# Patient Record
Sex: FEMALE | Race: WHITE | NOT HISPANIC OR LATINO | Employment: FULL TIME | URBAN - METROPOLITAN AREA
[De-identification: names, ages, dates, MRNs, and addresses within clinical notes are randomized per-mention and may not be internally consistent; named-entity substitution may affect disease eponyms.]

---

## 2020-09-04 ENCOUNTER — TELEPHONE (OUTPATIENT)
Dept: UROLOGY | Facility: MEDICAL CENTER | Age: 55
End: 2020-09-04

## 2020-09-04 NOTE — TELEPHONE ENCOUNTER
Please Triage - ER FU Kidney Stones ( Needs Surgery)  New Patient- [facility]    What is the reason for the patients appointment? Kidney Stones    CT 8/18  IMPRESSION:  Impression:   1  Stable hepatosplenomegaly with mild fatty infiltration of the liver  2  No change in mildly enlarged mediastinal and retroperitoneal lymph nodes  3  Multiple left renal calculi  Mild left hydroureteronephrosis with distal  ureteral calculus  4  Other findings as above  Do we accept the patient's insurance or is the patient Self-Pay? Provider:  Ab Griffin  Member ID#: [id number]     Has the patient had any previous urologist(s)? Yes     Have patient records been requested? Yes, being faxed     Has the patient had any outside testing done? Yes [facility]     What is the patients location preference for an office visit? [place]    Does the patient have a personal history of cancer?   Lymphoma, middle of chemo treatments    Patient can be reached at : [phone]

## 2020-09-08 NOTE — TELEPHONE ENCOUNTER
Patient scheduled for 9/14/20 at 2pm in the [facility] office with [address] B[place]  Please confirm

## 2020-09-11 NOTE — TELEPHONE ENCOUNTER
Patient called and canceled appointment due to being in the hospital  Patient advised that she will call back once she is discharged to get rescheduled

## 2020-09-21 NOTE — TELEPHONE ENCOUNTER
Patient canceled her previous appointment  Her CT reveals nonobstructing stones  Can be scheduled for next available new patient appointment   Patient should obtain her imaging on a disc prior to her visit

## 2020-09-21 NOTE — TELEPHONE ENCOUNTER
New Patient  Patient had CT abdomen pelvis w IV contrast done on 9/8/20 (results in care everywhere)  IMPRESSION:  Impression:   1  Abnormal mucosal wall thickening with fat stranding involving portions of the  colon, particularly the hepatic flexure, transverse colon, splenic flexure and  proximal to mid descending colon  Findings are concerning for colitis  No bowel  obstruction  No desire abscess or free air  2  Multiple left renal calculi are redemonstrated  No evidence for  hydroureteronephrosis  3  Scattered visible retroperitoneal lymph nodes are unchanged  4  Hepatosplenomegaly is redemonstrated  Hepatic steatosis

## 2020-09-21 NOTE — TELEPHONE ENCOUNTER
When patient calls the office back she can be scheduled for next available new patient appointment   Patient should obtain her imaging on a disc prior to her visit

## 2020-09-21 NOTE — TELEPHONE ENCOUNTER
Patient calling to r/s appointment  New Imaging in Care Everywhere    CT 9/8/2020  Impression:   1  Abnormal mucosal wall thickening with fat stranding involving portions of the  colon, particularly the hepatic flexure, transverse colon, splenic flexure and  proximal to mid descending colon  Findings are concerning for colitis  No bowel  obstruction  No desire abscess or free air  2  Multiple left renal calculi are redemonstrated  No evidence for  hydroureteronephrosis  3  Scattered visible retroperitoneal lymph nodes are unchanged  4  Hepatosplenomegaly is redemonstrated  Hepatic steatosis      She can be reached at [phone]

## 2020-09-29 ENCOUNTER — OFFICE VISIT (OUTPATIENT)
Dept: UROLOGY | Facility: CLINIC | Age: 55
End: 2020-09-29
Payer: COMMERCIAL

## 2020-09-29 VITALS
HEART RATE: 77 BPM | BODY MASS INDEX: 47.09 KG/M2 | HEIGHT: 66 IN | DIASTOLIC BLOOD PRESSURE: 70 MMHG | WEIGHT: 293 LBS | TEMPERATURE: 97.3 F | SYSTOLIC BLOOD PRESSURE: 140 MMHG

## 2020-09-29 DIAGNOSIS — N20.0 KIDNEY STONES: Primary | ICD-10-CM

## 2020-09-29 LAB
SL AMB  POCT GLUCOSE, UA: NORMAL
SL AMB LEUKOCYTE ESTERASE,UA: NORMAL
SL AMB POCT BILIRUBIN,UA: NORMAL
SL AMB POCT BLOOD,UA: NORMAL
SL AMB POCT CLARITY,UA: CLEAR
SL AMB POCT COLOR,UA: YELLOW
SL AMB POCT KETONES,UA: NORMAL
SL AMB POCT NITRITE,UA: NORMAL
SL AMB POCT PH,UA: 5
SL AMB POCT SPECIFIC GRAVITY,UA: 1.01
SL AMB POCT URINE PROTEIN: NORMAL
SL AMB POCT UROBILINOGEN: NORMAL

## 2020-09-29 PROCEDURE — 99243 OFF/OP CNSLTJ NEW/EST LOW 30: CPT | Performed by: PHYSICIAN ASSISTANT

## 2020-09-29 PROCEDURE — 81002 URINALYSIS NONAUTO W/O SCOPE: CPT | Performed by: PHYSICIAN ASSISTANT

## 2020-09-29 RX ORDER — FLUTICASONE FUROATE AND VILANTEROL TRIFENATATE 100; 25 UG/1; UG/1
1 POWDER RESPIRATORY (INHALATION) DAILY
COMMUNITY

## 2020-09-29 RX ORDER — ALBUTEROL SULFATE 2.5 MG/3ML
2.5 SOLUTION RESPIRATORY (INHALATION) EVERY 6 HOURS PRN
COMMUNITY

## 2020-09-29 RX ORDER — ASPIRIN 81 MG/1
81 TABLET, CHEWABLE ORAL DAILY
COMMUNITY

## 2020-09-29 RX ORDER — LEVOTHYROXINE SODIUM 0.1 MG/1
100 TABLET ORAL DAILY
COMMUNITY

## 2020-09-29 RX ORDER — FUROSEMIDE 20 MG/1
20 TABLET ORAL 2 TIMES DAILY
COMMUNITY

## 2020-09-29 RX ORDER — POTASSIUM CHLORIDE 1.5 G/1.77G
20 POWDER, FOR SOLUTION ORAL 2 TIMES DAILY
COMMUNITY

## 2020-09-29 RX ORDER — METOPROLOL SUCCINATE 25 MG/1
25 TABLET, EXTENDED RELEASE ORAL DAILY
COMMUNITY

## 2020-09-29 RX ORDER — CLOPIDOGREL BISULFATE 75 MG/1
75 TABLET ORAL DAILY
COMMUNITY

## 2020-09-29 RX ORDER — LISINOPRIL 10 MG/1
10 TABLET ORAL DAILY
COMMUNITY

## 2020-09-29 RX ORDER — SACCHAROMYCES BOULARDII 250 MG
250 CAPSULE ORAL 2 TIMES DAILY
COMMUNITY

## 2020-09-29 RX ORDER — ISOSORBIDE DINITRATE 10 MG/1
10 TABLET ORAL 3 TIMES DAILY
COMMUNITY

## 2020-09-29 NOTE — PROGRESS NOTES
1  Kidney stones    - POCT urine dip          Assessment and plan:       1  Nephrolithiasis  - patient was diagnosed incidentally with a ureteral stone while being evaluated for her non-Hodgkin's lymphoma and an episode of C diff   - subsequent CT scan showed resolution of hydronephrosis and ureteral stone was no longer seen  Patient remained asymptomatic   - urine dip today in the office is negative for blood  - she will plan to follow up in 1 year for follow-up of her kidney stones  Because she gets regular CT scans of the abdomen and pelvis for her lymphoma follow-up, we will use this as our or imaging for follow-up  Merary Montoya PA-C      Chief Complaint     Chief Complaint   Patient presents with    Nephrolithiasis         History of Present Illness     Manuela Snyder is a 48 y o  female patient establishing care with [facility] for kidney stones  She underwent a CT scan on 08/18/2020 which showed multiple left renal stones with mild left hydronephrosis due to distal ureteral stone  CT scan was repeated on 09/08/2020 and showed multiple left renal stones without evidence of hydronephrosis, distal ureteral stone no longer seen  Patient reports no previous history of kidney stones  She did not have any acute symptoms  This was discovered on a CT scan performed for follow-up of her non-Hodgkin's lymphoma  Urine testing today is negative for blood        Laboratory     No results found for: CREATININE    No results found for: PSA    Recent Results (from the past 1 hour(s))   POCT urine dip    Collection Time: 09/29/20  9:31 AM   Result Value Ref Range    LEUKOCYTE ESTERASE,UA NEG     NITRITE,UA NEG     SL AMB POCT UROBILINOGEN NEG     POCT URINE PROTEIN NEG      PH,UA 5     BLOOD,UA NEG     SPECIFIC GRAVITY,UA 1 010     KETONES,UA NEG     BILIRUBIN,UA NEG     GLUCOSE, UA NEG      COLOR,UA Yellow     CLARITY,UA Clear          Review of Systems     Review of Systems Constitutional: Negative for chills and fever  HENT: Negative  Eyes: Negative  Respiratory: Positive for shortness of breath  Negative for cough  Cardiovascular: Negative for chest pain  Gastrointestinal: Negative for constipation, diarrhea, nausea and vomiting  Endocrine: Negative  Genitourinary: Negative for difficulty urinating, dysuria, enuresis, flank pain, frequency, hematuria and urgency  Musculoskeletal: Positive for back pain  Skin: Negative  Neurological: Negative  Psychiatric/Behavioral: Negative  Allergies     Allergies   Allergen Reactions    Augmentin [Amoxicillin-Pot Clavulanate]     Erythromycin        Physical Exam     Physical Exam  Vitals signs and nursing note reviewed  Constitutional:       General: She is not in acute distress  Appearance: Normal appearance  She is well-developed  She is obese  She is not ill-appearing, toxic-appearing or diaphoretic  HENT:      Head: Normocephalic and atraumatic  Right Ear: External ear normal       Left Ear: External ear normal    Eyes:      General: No scleral icterus  Right eye: No discharge  Left eye: No discharge  Cardiovascular:      Rate and Rhythm: Normal rate  Pulmonary:      Effort: Pulmonary effort is normal       Comments: On oxygen via nasal cannula  Musculoskeletal:      Comments: Ambulates with a cane for assistance   Skin:     General: Skin is warm and dry  Neurological:      Mental Status: She is alert and oriented to person, place, and time  Psychiatric:         Mood and Affect: Mood normal          Behavior: Behavior normal          Thought Content:  Thought content normal          Judgment: Judgment normal            Vital Signs     Vitals:    09/29/20 0914   BP: 140/70   BP Location: Left arm   Patient Position: Sitting   Cuff Size: Adult   Pulse: 77   Temp: (!) 97 3 °F (36 3 °C)   Weight: (!) 148 kg (325 lb 6 4 oz)   Height: 5' 6" (1 676 m)         Current Medications       Current Outpatient Medications:     albuterol (2 5 mg/3 mL) 0 083 % nebulizer solution, Take 2 5 mg by nebulization every 6 (six) hours as needed for wheezing or shortness of breath, Disp: , Rfl:     aspirin 81 mg chewable tablet, Chew 81 mg daily, Disp: , Rfl:     clopidogrel (PLAVIX) 75 mg tablet, Take 75 mg by mouth daily, Disp: , Rfl:     fluticasone-vilanterol (Breo Ellipta) 100-25 mcg/inh inhaler, Inhale 1 puff daily Rinse mouth after use , Disp: , Rfl:     furosemide (LASIX) 20 mg tablet, Take 20 mg by mouth 2 (two) times a day, Disp: , Rfl:     isosorbide dinitrate (ISORDIL) 10 mg tablet, Take 10 mg by mouth 3 (three) times a day, Disp: , Rfl:     levothyroxine 100 mcg tablet, Take 100 mcg by mouth daily, Disp: , Rfl:     lisinopril (ZESTRIL) 10 mg tablet, Take 10 mg by mouth daily, Disp: , Rfl:     metoprolol succinate (TOPROL-XL) 25 mg 24 hr tablet, Take 25 mg by mouth daily, Disp: , Rfl:     potassium chloride (Klor-Con) 20 mEq packet, Take 20 mEq by mouth 2 (two) times a day, Disp: , Rfl:     saccharomyces boulardii (FLORASTOR) 250 mg capsule, Take 250 mg by mouth 2 (two) times a day, Disp: , Rfl:     tiotropium (SPIRIVA) 18 mcg inhalation capsule, Place 18 mcg into inhaler and inhale daily, Disp: , Rfl:       Active Problems     There is no problem list on file for this patient          Past Medical History     Past Medical History:   Diagnosis Date    Asthma     COPD (chronic obstructive pulmonary disease) (HCC)     Diabetes (HCC)     GERD (gastroesophageal reflux disease)     Heart attack ([address] )     Hypertension     Hypothyroidism     Non Hodgkin's lymphoma ([facility] [address]          Surgical History     Past Surgical History:   Procedure Laterality Date    KNEE SURGERY Left     TONSILECTOMY AND ADNOIDECTOMY           Family History     Family History   Problem Relation Age of Onset    Diabetes Father     Lung cancer Father     Heart disease Father     Diabetes Mother  Cancer Mother          Social History     Social History       Radiology

## 2024-06-18 ENCOUNTER — TELEPHONE (OUTPATIENT)
Dept: GASTROENTEROLOGY | Facility: CLINIC | Age: 59
End: 2024-06-18

## 2024-06-18 NOTE — TELEPHONE ENCOUNTER
Pt is due for a colonoscopy - hx of sessile serrated adenoma polyp with Dr Bethea.   Called and spoke to pt whom informed she uses LV and does not plan on any more colonoscopies. I advised pt that she is due for the colonoscopy at this time and pt said thank you and disconnected call.